# Patient Record
Sex: FEMALE | Race: BLACK OR AFRICAN AMERICAN | Employment: UNEMPLOYED | URBAN - METROPOLITAN AREA
[De-identification: names, ages, dates, MRNs, and addresses within clinical notes are randomized per-mention and may not be internally consistent; named-entity substitution may affect disease eponyms.]

---

## 2017-04-04 ENCOUNTER — TELEPHONE (OUTPATIENT)
Dept: INTERNAL MEDICINE CLINIC | Facility: CLINIC | Age: 47
End: 2017-04-04

## 2017-04-04 NOTE — TELEPHONE ENCOUNTER
Called patient regarding NP No Show today for Dr Karma Torrez; advised pt of office policy and she stated she called when phones were down yesterday and couldn't get thru in am.  Advised no show 1st pt understood

## 2017-06-23 ENCOUNTER — LAB ENCOUNTER (OUTPATIENT)
Dept: LAB | Age: 47
End: 2017-06-23
Attending: INTERNAL MEDICINE
Payer: COMMERCIAL

## 2017-06-23 ENCOUNTER — OFFICE VISIT (OUTPATIENT)
Dept: INTERNAL MEDICINE CLINIC | Facility: CLINIC | Age: 47
End: 2017-06-23

## 2017-06-23 VITALS
RESPIRATION RATE: 17 BRPM | SYSTOLIC BLOOD PRESSURE: 110 MMHG | TEMPERATURE: 99 F | BODY MASS INDEX: 30.05 KG/M2 | HEIGHT: 63.5 IN | HEART RATE: 72 BPM | DIASTOLIC BLOOD PRESSURE: 76 MMHG | WEIGHT: 171.75 LBS

## 2017-06-23 DIAGNOSIS — G35 MULTIPLE SCLEROSIS (HCC): Primary | ICD-10-CM

## 2017-06-23 DIAGNOSIS — E66.3 OVERWEIGHT (BMI 25.0-29.9): ICD-10-CM

## 2017-06-23 DIAGNOSIS — G35 MULTIPLE SCLEROSIS (HCC): ICD-10-CM

## 2017-06-23 PROCEDURE — 99204 OFFICE O/P NEW MOD 45 MIN: CPT | Performed by: INTERNAL MEDICINE

## 2017-06-23 PROCEDURE — 36415 COLL VENOUS BLD VENIPUNCTURE: CPT

## 2017-06-23 PROCEDURE — 80053 COMPREHEN METABOLIC PANEL: CPT

## 2017-06-23 PROCEDURE — 85025 COMPLETE CBC W/AUTO DIFF WBC: CPT

## 2017-06-23 RX ORDER — MELATONIN
1000 DAILY
COMMUNITY

## 2017-06-23 RX ORDER — MULTIVIT-MINS NO.63/IRON/FOLIC 27 MG-1 MG
1 TABLET ORAL DAILY
COMMUNITY

## 2017-06-23 RX ORDER — FINGOLIMOD HCL 0.5 MG/1
0.5 CAPSULE ORAL DAILY
Refills: 2 | COMMUNITY
Start: 2017-06-09

## 2017-06-23 NOTE — PATIENT INSTRUCTIONS
Dr. Oscar Dhaliwal, Layo Odom, Dr. Qasim Almanzar, and Dr. Hussein Leger. Goals for Weight Loss:  1. Please try to eat breakfast every morning. 2. Small, frequent meals improves your metabolism and promotes weight loss  3.  Your portions should be

## 2017-06-23 NOTE — PROGRESS NOTES
Lida Mancuso is a 42year old female. HPI:   Patient presents to establish care. She needs to find a doctor more locally. Following with Dr. Wicho Alberts out of [facility]. 1. Multiple Sclerosis: diagnosed in 4/2016. Started with vertigo that kept recurring.  Marisela Saldana History:      Smoking Status: Never Smoker                      Smokeless Status: Never Used                        Alcohol Use: No                    EXAM:   /76 mmHg  Pulse 72  Temp(Src) 98.6 °F (37 °C) (Oral)  Resp 17  Ht 63.5\"  Wt 171 lb 12 oz

## 2017-09-22 ENCOUNTER — TELEPHONE (OUTPATIENT)
Dept: INTERNAL MEDICINE CLINIC | Facility: CLINIC | Age: 47
End: 2017-09-22

## 2017-09-22 NOTE — TELEPHONE ENCOUNTER
Called patient regarding no show/missed appointment. Left voice message regarding no show status for today. Informed no show/cancellation policy/ a potential $52.10 (since it is pt's second no show for this year)+option to reschedule.

## (undated) NOTE — MR AVS SNAPSHOT
[place]  [address] [address]  [address] [facility] [id number] [phone]               Thank you for choosing us for your health care visit with Linnette Alas MD.  We are glad to serve you and happy to provide you with this summ 1. Please try to eat breakfast every morning. 2. Small, frequent meals improves your metabolism and promotes weight loss  3. Your portions should be a dinner plate.  1/2 should be vegetables, 1/4 lean protein (chicken, fish, turkey), and 1/4 can be carbohy Healthy Diet and Regular Exercise  [employer] of [address] [place], [place] [address] [facility] for making healthy food choices  -   Enjoy your food, but eat less. Fully enjoy your food when eating. Don’t eat while distracted and slow down. Avoid over sized portions.    Rebecca Schmitz